# Patient Record
Sex: MALE | Race: WHITE
[De-identification: names, ages, dates, MRNs, and addresses within clinical notes are randomized per-mention and may not be internally consistent; named-entity substitution may affect disease eponyms.]

---

## 2018-04-03 ENCOUNTER — HOSPITAL ENCOUNTER (EMERGENCY)
Dept: HOSPITAL 96 - M.ERS | Age: 63
Discharge: HOME | End: 2018-04-03
Payer: COMMERCIAL

## 2018-04-03 VITALS — DIASTOLIC BLOOD PRESSURE: 82 MMHG | SYSTOLIC BLOOD PRESSURE: 128 MMHG

## 2018-04-03 VITALS — BODY MASS INDEX: 22.53 KG/M2 | WEIGHT: 170 LBS | HEIGHT: 73 IN

## 2018-04-03 DIAGNOSIS — R06.00: ICD-10-CM

## 2018-04-03 DIAGNOSIS — F17.210: ICD-10-CM

## 2018-04-03 DIAGNOSIS — M10.9: Primary | ICD-10-CM

## 2018-04-03 DIAGNOSIS — R55: ICD-10-CM

## 2018-04-03 DIAGNOSIS — F10.129: ICD-10-CM

## 2018-04-03 LAB
ABSOLUTE BASOPHILS: 0 THOU/UL (ref 0–0.2)
ABSOLUTE EOSINOPHILS: 0 THOU/UL (ref 0–0.7)
ABSOLUTE MONOCYTES: 0.7 THOU/UL (ref 0–1.2)
ALBUMIN SERPL-MCNC: 3.2 G/DL (ref 3.4–5)
ALP SERPL-CCNC: 138 U/L (ref 46–116)
ALT SERPL-CCNC: 57 U/L (ref 30–65)
ANION GAP SERPL CALC-SCNC: 12 MMOL/L (ref 7–16)
AST SERPL-CCNC: 76 U/L (ref 15–37)
BASOPHILS NFR BLD AUTO: 0.4 %
BILIRUB SERPL-MCNC: 0.7 MG/DL
BUN SERPL-MCNC: 4 MG/DL (ref 7–18)
CALCIUM SERPL-MCNC: 8 MG/DL (ref 8.5–10.1)
CHLORIDE SERPL-SCNC: 91 MMOL/L (ref 98–107)
CO2 SERPL-SCNC: 22 MMOL/L (ref 21–32)
CREAT SERPL-MCNC: 0.4 MG/DL (ref 0.6–1.3)
EOSINOPHIL NFR BLD: 0 %
GLUCOSE SERPL-MCNC: 95 MG/DL (ref 70–99)
GRANULOCYTES NFR BLD MANUAL: 80.3 %
HCT VFR BLD CALC: 41.5 % (ref 42–52)
HGB BLD-MCNC: 14.4 GM/DL (ref 14–18)
INR PPP: 1.1
LIPASE: 118 U/L (ref 73–393)
LYMPHOCYTES # BLD: 1.3 THOU/UL (ref 0.8–5.3)
LYMPHOCYTES NFR BLD AUTO: 12.9 %
MCH RBC QN AUTO: 36.6 PG (ref 26–34)
MCHC RBC AUTO-ENTMCNC: 34.6 G/DL (ref 28–37)
MCV RBC: 105.7 FL (ref 80–100)
MONOCYTES NFR BLD: 6.4 %
MPV: 8.6 FL. (ref 7.2–11.1)
NEUTROPHILS # BLD: 8.2 THOU/UL (ref 1.6–8.1)
NT-PRO BRAIN NAT PEPTIDE: 41 PG/ML (ref ?–300)
NUCLEATED RBCS: 0 /100WBC
PLATELET COUNT*: 155 THOU/UL (ref 150–400)
POTASSIUM SERPL-SCNC: 4 MMOL/L (ref 3.5–5.1)
PROT SERPL-MCNC: 6.9 G/DL (ref 6.4–8.2)
PROTHROMBIN TIME: 10.3 SECONDS (ref 9.2–11.5)
RBC # BLD AUTO: 3.92 MIL/UL (ref 4.5–6)
RDW-CV: 11.7 % (ref 10.5–14.5)
SODIUM SERPL-SCNC: 125 MMOL/L (ref 136–145)
TROPONIN-I LEVEL: <0.06 NG/ML (ref ?–0.06)
WBC # BLD AUTO: 10.2 THOU/UL (ref 4–11)

## 2018-04-03 NOTE — EKG
[place] [address]
Phone:  [phone]                     ELECTROCARDIOGRAM REPORT      
_______________________________________________________________________________
 
Name:       KWABENA MAY                 [id number]      Account #:      [id number]  
Admission:  18     Attend Phys:                         
Discharge:  18     Date of Birth:  [birth year]  
         Report #: [id number]
    [id number]
_______________________________________________________________________________
THIS REPORT FOR:  //name//                      
 
                         [facility] ED
                                       
Test Date:    2018               Test Time:    06:53:12
Pat Name:     KWABENA CHRISTINAMER             Department:   
Patient ID:   [id number]            Room:          
Gender:       M                        Technician:   JACKIE
:          1955               Requested By: Leyla Jo
Order Number: [id number] MD:   David Posey
                                 Measurements
Intervals                              Axis          
Rate:         95                       P:            68
MI:           163                      QRS:          64
QRSD:         91                       T:            70
QT:           387                                    
QTc:          487                                    
                           Interpretive Statements
Sinus rhythm
Anteroseptal infarct, old
Compared to ECG 2017 15:18:06
Sinus tachycardia no longer present
Myocardial infarct finding still present
 
Electronically Signed On 4-3-2018 14:51:27 CDT by David Posey
[email or web address]
 
 
 
 
 
 
 
 
 
 
 
 
 
 
 
 
 
  <ELECTRONICALLY SIGNED>
                                           By: David Posey MD, [facility]      
  18     1451
D: 18 0653   _____________________________________
T: 18 0653   David Posey MD, [facility]        /EPI